# Patient Record
Sex: MALE | Race: BLACK OR AFRICAN AMERICAN | NOT HISPANIC OR LATINO | Employment: UNEMPLOYED | URBAN - NONMETROPOLITAN AREA
[De-identification: names, ages, dates, MRNs, and addresses within clinical notes are randomized per-mention and may not be internally consistent; named-entity substitution may affect disease eponyms.]

---

## 2017-02-15 ENCOUNTER — HOSPITAL ENCOUNTER (EMERGENCY)
Facility: HOSPITAL | Age: 52
Discharge: HOME | End: 2017-02-15
Admitting: FAMILY MEDICINE

## 2017-02-15 ENCOUNTER — APPOINTMENT (OUTPATIENT)
Dept: GENERAL RADIOLOGY | Facility: HOSPITAL | Age: 52
End: 2017-02-15

## 2017-02-15 VITALS
RESPIRATION RATE: 16 BRPM | WEIGHT: 197 LBS | SYSTOLIC BLOOD PRESSURE: 140 MMHG | HEIGHT: 72 IN | BODY MASS INDEX: 26.68 KG/M2 | HEART RATE: 24 BPM | DIASTOLIC BLOOD PRESSURE: 89 MMHG | OXYGEN SATURATION: 98 % | TEMPERATURE: 98 F

## 2017-02-15 DIAGNOSIS — M25.551 PAIN OF RIGHT HIP JOINT: ICD-10-CM

## 2017-02-15 DIAGNOSIS — M54.5 LOW BACK PAIN, UNSPECIFIED BACK PAIN LATERALITY, UNSPECIFIED CHRONICITY, WITH SCIATICA PRESENCE UNSPECIFIED: Primary | ICD-10-CM

## 2017-02-15 PROCEDURE — 99283 EMERGENCY DEPT VISIT LOW MDM: CPT

## 2017-02-15 PROCEDURE — 73502 X-RAY EXAM HIP UNI 2-3 VIEWS: CPT

## 2017-02-15 PROCEDURE — 72110 X-RAY EXAM L-2 SPINE 4/>VWS: CPT

## 2017-02-15 PROCEDURE — 25010000002 HYDROMORPHONE PER 4 MG: Performed by: NURSE PRACTITIONER

## 2017-02-15 PROCEDURE — 96372 THER/PROPH/DIAG INJ SC/IM: CPT

## 2017-02-15 RX ORDER — AMLODIPINE BESYLATE 5 MG/1
10 TABLET ORAL DAILY
COMMUNITY
End: 2018-07-12 | Stop reason: SDUPTHER

## 2017-02-15 RX ORDER — TIZANIDINE 4 MG/1
4 TABLET ORAL EVERY 12 HOURS PRN
COMMUNITY

## 2017-02-15 RX ORDER — METHYLPREDNISOLONE 4 MG/1
TABLET ORAL
Qty: 21 TABLET | Refills: 0 | Status: SHIPPED | OUTPATIENT
Start: 2017-02-15 | End: 2017-04-20

## 2017-02-15 RX ORDER — HYDROCODONE BITARTRATE AND ACETAMINOPHEN 7.5; 325 MG/1; MG/1
1 TABLET ORAL EVERY 6 HOURS PRN
COMMUNITY
End: 2017-04-05

## 2017-02-15 RX ORDER — HYDROCHLOROTHIAZIDE 25 MG/1
25 TABLET ORAL DAILY
COMMUNITY

## 2017-02-15 RX ORDER — ONDANSETRON 4 MG/1
4 TABLET, ORALLY DISINTEGRATING ORAL ONCE
Status: COMPLETED | OUTPATIENT
Start: 2017-02-15 | End: 2017-02-15

## 2017-02-15 RX ADMIN — ONDANSETRON 4 MG: 4 TABLET, ORALLY DISINTEGRATING ORAL at 18:42

## 2017-02-15 RX ADMIN — HYDROMORPHONE HYDROCHLORIDE 1 MG: 1 INJECTION, SOLUTION INTRAMUSCULAR; INTRAVENOUS; SUBCUTANEOUS at 18:42

## 2017-02-16 NOTE — ED PROVIDER NOTES
Subjective   Patient is a 51 y.o. male presenting with back pain.   History provided by:  Patient   used: No    Back Pain   Location:  Lumbar spine  Quality:  Aching and stiffness  Radiates to: right hip.  Pain severity:  Mild  Pain is:  Same all the time  Onset quality:  Sudden  Duration:  2 days  Timing:  Constant  Progression:  Worsening  Chronicity:  New  Context: not emotional stress, not falling, not jumping from heights, not lifting heavy objects, not MCA, not MVA, not pedestrian accident, not physical stress, not recent illness, not recent injury and not twisting    Relieved by:  Nothing  Worsened by:  Nothing  Ineffective treatments:  None tried  Associated symptoms: no abdominal pain, no abdominal swelling, no bladder incontinence, no bowel incontinence, no chest pain, no dysuria, no fever, no headaches, no leg pain, no numbness, no paresthesias, no pelvic pain, no perianal numbness, no tingling, no weakness and no weight loss    Risk factors: no hx of cancer, no hx of osteoporosis, no lack of exercise, no menopause, not obese, not pregnant, no recent surgery, no steroid use and no vascular disease        Review of Systems   Constitutional: Negative for fever and weight loss.   Cardiovascular: Negative for chest pain.   Gastrointestinal: Negative for abdominal pain and bowel incontinence.   Genitourinary: Negative for bladder incontinence, dysuria and pelvic pain.   Musculoskeletal: Positive for back pain.   Neurological: Negative for tingling, weakness, numbness, headaches and paresthesias.   All other systems reviewed and are negative.      Past Medical History   Diagnosis Date   • Degenerative disc disease, lumbar    • Hypertension        Allergies   Allergen Reactions   • Tramadol        Past Surgical History   Procedure Laterality Date   • Back surgery  07/2016     Dr Mota       History reviewed. No pertinent family history.    Social History     Social History   • Marital status:       Spouse name: N/A   • Number of children: N/A   • Years of education: N/A     Social History Main Topics   • Smoking status: Current Every Day Smoker     Packs/day: 0.50     Types: Cigarettes   • Smokeless tobacco: None   • Alcohol use 4.2 oz/week     7 Cans of beer per week   • Drug use: No   • Sexual activity: Defer     Other Topics Concern   • None     Social History Narrative   • None           Objective   Physical Exam   Constitutional: He is oriented to person, place, and time. He appears well-developed and well-nourished.   HENT:   Head: Normocephalic and atraumatic.   Eyes: Conjunctivae are normal. Pupils are equal, round, and reactive to light.   Neck: Normal range of motion. Neck supple.   Cardiovascular: Normal rate, regular rhythm and normal heart sounds.    Pulmonary/Chest: Effort normal and breath sounds normal.   Abdominal: Soft. Bowel sounds are normal.   Musculoskeletal: Normal range of motion.        Legs:  Neurological: He is alert and oriented to person, place, and time.   Skin: Skin is warm and dry.   Psychiatric: He has a normal mood and affect.   Nursing note and vitals reviewed.      Procedures         ED Course  ED Course   Comment By Time   Xray right hip: No fracture or acute abnormality. There is a linear bony  density in the soft tissues lateral to the right acetabulum which may be  related to old trauma. An Jefferson, APRN 02/15 1829   Xray lumbar: No acute lumbar spine abnormality. Advanced degenerative  disc disease at L3-4, L4-5 and L5-S1. An Jefferson, APRN 02/15 1829   Assessment at this time.  The lumbar x-ray showsno acute abnormality.  Patient has had degenerative changes noted.  On x-ray the right hip there is no fracture or acute abnormality noted.  There is a linear density soft tissue that was noted.  Discussed this with patient.  He denies any previous surgeries to the right hip.  He denies any previous trauma or injuries.  Otherwise this is likely the  cause of his right hip pain.Crutches to assist with ambulation.  Otherwise please follow-up to orthopedics will provide him the name of the orthopedic physician that is on call.  Advised follow-up tomorrow at the walk-in clinic.  Patient has pain medications at home that he is prescribed by pain management.Patient states this pain.  At this time patient will be discharged home in stable condition.  Rest and return to ER as needed.  I will give him prescription for a Medrol Dosepak to help his low back pain.  Again he is advised to return to the ER if any new or worsening symptoms. An Jefferson, APRN 02/15 1911                  MDM  Number of Diagnoses or Management Options  Low back pain, unspecified back pain laterality, unspecified chronicity, with sciatica presence unspecified: new and requires workup  Pain of right hip joint: new and requires workup     Amount and/or Complexity of Data Reviewed  Tests in the radiology section of CPT®: ordered and reviewed  Discuss the patient with other providers: yes    Patient Progress  Patient progress: stable      Final diagnoses:   Low back pain, unspecified back pain laterality, unspecified chronicity, with sciatica presence unspecified   Pain of right hip joint            An Jefferson, APRN  02/15/17 1912       An Jefferson, APRN  02/21/17 1903

## 2017-02-16 NOTE — DISCHARGE INSTRUCTIONS
Please follow-up with orthopedics.  Please call tomorrow for the walk-in clinic hours.  Use the pain medication that you have at home for pain control.  Use Medrol Dosepak to help with inflammation.  Return to the ER as needed sooner if any new or worsening symptoms.

## 2017-04-05 ENCOUNTER — HOSPITAL ENCOUNTER (EMERGENCY)
Facility: HOSPITAL | Age: 52
Discharge: HOME | End: 2017-04-05
Admitting: FAMILY MEDICINE

## 2017-04-05 ENCOUNTER — APPOINTMENT (OUTPATIENT)
Dept: GENERAL RADIOLOGY | Facility: HOSPITAL | Age: 52
End: 2017-04-05

## 2017-04-05 ENCOUNTER — APPOINTMENT (OUTPATIENT)
Dept: CT IMAGING | Facility: HOSPITAL | Age: 52
End: 2017-04-05

## 2017-04-05 VITALS
BODY MASS INDEX: 27.93 KG/M2 | HEIGHT: 72 IN | TEMPERATURE: 98 F | RESPIRATION RATE: 20 BRPM | WEIGHT: 206.2 LBS | DIASTOLIC BLOOD PRESSURE: 95 MMHG | OXYGEN SATURATION: 98 % | HEART RATE: 81 BPM | SYSTOLIC BLOOD PRESSURE: 155 MMHG
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
## 2017-04-05 DIAGNOSIS — S20.212A CONTUSION, CHEST WALL, LEFT, INITIAL ENCOUNTER: ICD-10-CM

## 2017-04-05 DIAGNOSIS — S39.012A BACK STRAIN, INITIAL ENCOUNTER: ICD-10-CM

## 2017-04-05 PROCEDURE — 96372 THER/PROPH/DIAG INJ SC/IM: CPT

## 2017-04-05 PROCEDURE — 99283 EMERGENCY DEPT VISIT LOW MDM: CPT

## 2017-04-05 PROCEDURE — 72110 X-RAY EXAM L-2 SPINE 4/>VWS: CPT

## 2017-04-05 PROCEDURE — 25010000002 DIAZEPAM PER 5 MG: Performed by: FAMILY MEDICINE

## 2017-04-05 PROCEDURE — 71020 HC CHEST PA AND LATERAL: CPT

## 2017-04-05 PROCEDURE — 25010000002 MORPHINE PER 10 MG: Performed by: FAMILY MEDICINE

## 2017-04-05 PROCEDURE — 72125 CT NECK SPINE W/O DYE: CPT

## 2017-04-05 PROCEDURE — 72072 X-RAY EXAM THORAC SPINE 3VWS: CPT

## 2017-04-05 RX ORDER — HYDROCODONE BITARTRATE AND ACETAMINOPHEN 7.5; 325 MG/1; MG/1
1 TABLET ORAL EVERY 6 HOURS PRN
Qty: 12 TABLET | Refills: 0 | Status: SHIPPED | OUTPATIENT
Start: 2017-04-05 | End: 2018-07-12

## 2017-04-05 RX ORDER — ONDANSETRON 4 MG/1
4 TABLET, ORALLY DISINTEGRATING ORAL ONCE
Status: COMPLETED | OUTPATIENT
Start: 2017-04-05 | End: 2017-04-05

## 2017-04-05 RX ORDER — ASPIRIN 81 MG/1
81 TABLET ORAL DAILY
COMMUNITY

## 2017-04-05 RX ORDER — DIAZEPAM 5 MG/ML
5 INJECTION, SOLUTION INTRAMUSCULAR; INTRAVENOUS ONCE
Status: COMPLETED | OUTPATIENT
Start: 2017-04-05 | End: 2017-04-05

## 2017-04-05 RX ORDER — MORPHINE SULFATE 4 MG/ML
4 INJECTION, SOLUTION INTRAMUSCULAR; INTRAVENOUS ONCE
Status: COMPLETED | OUTPATIENT
Start: 2017-04-05 | End: 2017-04-05

## 2017-04-05 RX ADMIN — ONDANSETRON 4 MG: 4 TABLET, ORALLY DISINTEGRATING ORAL at 13:51

## 2017-04-05 RX ADMIN — MORPHINE SULFATE 4 MG: 4 INJECTION, SOLUTION INTRAMUSCULAR; INTRAVENOUS at 13:50

## 2017-04-05 RX ADMIN — DIAZEPAM 5 MG: 5 INJECTION, SOLUTION INTRAMUSCULAR; INTRAVENOUS at 13:51
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
## 2017-04-20 ENCOUNTER — OFFICE VISIT (OUTPATIENT)
Dept: CARDIOLOGY | Facility: CLINIC | Age: 52
End: 2017-04-20

## 2017-04-20 VITALS
WEIGHT: 201.6 LBS | HEIGHT: 72 IN | SYSTOLIC BLOOD PRESSURE: 116 MMHG | HEART RATE: 63 BPM | DIASTOLIC BLOOD PRESSURE: 86 MMHG | BODY MASS INDEX: 27.3 KG/M2

## 2017-04-20 DIAGNOSIS — R00.2 PALPITATIONS: ICD-10-CM

## 2017-04-20 DIAGNOSIS — I48.92 ATRIAL FLUTTER, UNSPECIFIED TYPE (HCC): ICD-10-CM

## 2017-04-20 DIAGNOSIS — I10 ESSENTIAL HYPERTENSION: Primary | ICD-10-CM

## 2017-04-20 PROCEDURE — 93000 ELECTROCARDIOGRAM COMPLETE: CPT | Performed by: INTERNAL MEDICINE

## 2017-04-20 PROCEDURE — 99204 OFFICE O/P NEW MOD 45 MIN: CPT | Performed by: INTERNAL MEDICINE

## 2017-04-20 NOTE — ASSESSMENT & PLAN NOTE
Hypertension is improving with treatment.  Continue current treatment regimen.  Blood pressure will be reassessed at the next regular appointment.

## 2017-04-20 NOTE — ASSESSMENT & PLAN NOTE
Unclear whether he actually had this or not - ECG in office today with high frequency artifact that could be misinterpreted as flutter.  Will need to obtain ECG performed in Dr. Sharma's clinic before confirming diagnosis.

## 2017-04-20 NOTE — PROGRESS NOTES
"    [facility] - CARDIOLOGY  New Patient Initial Outpatient Evaulation    Primary Care Physician: Pete Sharma Jr., MD    Subjective     Chief Complaint: palpitations    51 year old referred from Dr. Sharma, whom he saw for a check up on 3/24/17.  Per records and my conversation with Dr. Sharma that day, the patient was incidentally noted on his exam to have an irregular heart beat.  Records indicate an ECG was done that showed atrial flutter with variable AV block (that ECG is not available for my review today).  Patient was started on aspirin for stroke prevention, and comes today for first cardiology evaluation.  He does report palpitations as described below, though he has noticed these for years and is not worried by them.  In fact, he reports an overnight admission \"years ago\" at [facility] for this, and recalls wearing a 24-hour Holter.  He denies recollection of what was found out during those investigations.    Palpitations    This is a chronic problem. The current episode started more than 1 year ago. The problem occurs intermittently. The problem has been unchanged. On average, each episode lasts 2 minutes. Associated symptoms include chest fullness, dizziness, numbness and shortness of breath. Pertinent negatives include no chest pain, coughing, irregular heartbeat, malaise/fatigue, nausea, near-syncope, syncope, vomiting or weakness. He has tried nothing for the symptoms.   Shortness of Breath   This is a new problem. The current episode started today (Reports getting very out of breath walking in from garage today, which he says has never happened before.). The problem has been unchanged. Associated symptoms include neck pain. Pertinent negatives include no abdominal pain, chest pain, claudication, leg swelling, orthopnea, PND, syncope, vomiting or wheezing.         Review of Systems   Constitution: Negative. Negative for weakness, malaise/fatigue, weight gain and weight loss.   HENT: Negative.  Negative " for nosebleeds.    Eyes: Positive for blurred vision.   Cardiovascular: Positive for dyspnea on exertion. Negative for chest pain, claudication, irregular heartbeat, leg swelling, near-syncope, orthopnea, palpitations, paroxysmal nocturnal dyspnea and syncope.   Respiratory: Positive for shortness of breath and snoring. Negative for cough and wheezing.    Endocrine: Negative.    Hematologic/Lymphatic: Negative.  Negative for bleeding problem. Does not bruise/bleed easily.   Skin: Negative.    Musculoskeletal: Positive for arthritis, back pain, joint pain, joint swelling, muscle cramps, muscle weakness, neck pain and stiffness.   Gastrointestinal: Negative.  Negative for bloating, abdominal pain, dysphagia, heartburn, hematemesis, hematochezia, melena, nausea and vomiting.   Genitourinary: Negative.  Negative for hematuria and non-menstrual bleeding.   Neurological: Positive for dizziness and numbness. Negative for light-headedness and loss of balance.        Right foot numb from surgery    Psychiatric/Behavioral: Negative for memory loss.   Allergic/Immunologic: Negative.         Otherwise complete ROS reviewed and negative except as mentioned in the HPI.      Past Medical History:   Past Medical History:   Diagnosis Date   • Atrial flutter    • Degenerative disc disease, lumbar    • Hypertension        Past Surgical History:  Past Surgical History:   Procedure Laterality Date   • BACK SURGERY  07/2016    Dr Mota       Family History: family history includes No Known Problems in his father and mother.    Social History:  reports that he has been smoking Cigarettes.  He has been smoking about 0.10 packs per day. He does not have any smokeless tobacco history on file. He reports that he drinks about 4.2 oz of alcohol per week  He reports that he does not use illicit drugs.    Medications:  Prior to Admission medications    Medication Sig Start Date End Date Taking? Authorizing Provider   amLODIPine (NORVASC) 5 MG  "tablet Take 10 mg by mouth Daily.   Yes Historical Provider, MD   aspirin 81 MG EC tablet Take 81 mg by mouth Daily.   Yes Historical Provider, MD   GABAPENTIN, ONCE-DAILY, PO Take 300 mg by mouth 3 (Three) Times a Day.   Yes Historical Provider, MD   hydrochlorothiazide (HYDRODIURIL) 25 MG tablet Take 25 mg by mouth Daily.   Yes Historical Provider, MD   HYDROcodone-acetaminophen (NORCO) 7.5-325 MG per tablet Take 1 tablet by mouth Every 6 (Six) Hours As Needed for Moderate Pain (4-6). 4/5/17  Yes Josias Miller MD   tiZANidine (ZANAFLEX) 4 MG tablet Take 4 mg by mouth Every 12 (Twelve) Hours As Needed for muscle spasms.   Yes Historical Provider, MD   oxyCODONE-acetaminophen (PERCOCET) 5-325 MG per tablet Take 1 tablet by mouth every 12 (twelve) hours as needed for severe pain (7-10). 9/8/16 4/20/17 Yes Frederick Mota MD   MethylPREDNISolone (MEDROL, AYDIN,) 4 MG tablet Take as directed on package instructions. 2/15/17 4/20/17  [facility] Jesus     Allergies:  Allergies   Allergen Reactions   • Tramadol Other (See Comments)     Bradycardia       Objective     Vital Signs: /86 (BP Location: Left arm, Patient Position: Sitting)  Pulse 63  Ht 72\" (182.9 cm)  Wt 201 lb 9.6 oz (91.4 kg)  BMI 27.34 kg/m2    Physical Exam   Constitutional: No distress.   HENT:   Mouth/Throat: Oropharynx is clear. Pharynx is normal.   Neck: Normal range of motion and thyroid normal. Neck supple. No JVD present. No thyromegaly present.   Cardiovascular: Normal rate, regular rhythm, S1 normal, S2 normal, normal heart sounds, intact distal pulses and normal pulses.   No extrasystoles are present. PMI is not displaced.    Pulmonary/Chest: Effort normal and breath sounds normal.   Abdominal: Soft. Bowel sounds are normal. He exhibits no distension. There is no splenomegaly or hepatomegaly. There is no tenderness.   Musculoskeletal: He exhibits no edema or tenderness.   Neurological: He is alert and oriented to person, " place, and time.   Skin: Skin is warm and dry.       Results Reviewed:      ECG 12 Lead  Date/Time: 4/20/2017 9:41 AM  Performed by: SARAH BOSS  Authorized by: SARAH BOSS   Rhythm: sinus rhythm  T depression: II, III, aVF, V5, V6 and V4  Clinical impression: abnormal ECG            Reviewed Dr. Sharma's clinic visit note from 3/24/17; no ECG available  No results found for: CHOL, TRIG, HDL, LDLCALC, VLDL, LDLHDL  No results found for: HGBA1C    Assessment / Plan      Problem List Items Addressed This Visit        Cardiovascular and Mediastinum    Hypertension    Current Assessment & Plan     Hypertension is improving with treatment.  Continue current treatment regimen.  Blood pressure will be reassessed at the next regular appointment.         Palpitations - check echocardiogram.    Atrial flutter    Overview     Reportedly seen on ECG by Dr. Sharma 3/24/17. CHADS2-VASc=1 (HTN); on aspirin.         Current Assessment & Plan     Unclear whether he actually had this or not - ECG in office today with high frequency artifact that could be misinterpreted as flutter.  Will need to obtain ECG performed in Dr. Sharma's clinic before confirming diagnosis.             Sarah Boss MD   04/20/17   9:58 AM     Addendum (5/26/17 at 1423)    We received 12-lead electrocardiogram from Dr. Sharma's office performed on 3/23/17 at 1645: Does show atrial flutter with variable AV conduction.  Patient is on appropriate therapy with anticoagulation in the form of aspirin given his low CHADS score.  We will schedule him for annual follow-up.    Sarah Boss MD  2:24 PM  05/26/17

## 2017-04-26 ENCOUNTER — HOSPITAL ENCOUNTER (OUTPATIENT)
Dept: CARDIOLOGY | Facility: HOSPITAL | Age: 52
Discharge: HOME | End: 2017-04-26
Attending: INTERNAL MEDICINE | Admitting: INTERNAL MEDICINE

## 2017-04-26 VITALS
DIASTOLIC BLOOD PRESSURE: 60 MMHG | BODY MASS INDEX: 27.22 KG/M2 | WEIGHT: 201 LBS | SYSTOLIC BLOOD PRESSURE: 130 MMHG | HEIGHT: 72 IN

## 2017-04-26 DIAGNOSIS — R00.2 PALPITATIONS: ICD-10-CM

## 2017-04-26 PROCEDURE — 93306 TTE W/DOPPLER COMPLETE: CPT

## 2017-04-26 PROCEDURE — 93306 TTE W/DOPPLER COMPLETE: CPT | Performed by: INTERNAL MEDICINE

## 2017-04-28 LAB
BH CV ECHO MEAS - AO MAX PG (FULL): 4.7 MMHG
BH CV ECHO MEAS - AO MAX PG: 10.8 MMHG
BH CV ECHO MEAS - AO MEAN PG (FULL): 3 MMHG
BH CV ECHO MEAS - AO MEAN PG: 6 MMHG
BH CV ECHO MEAS - AO ROOT AREA: 9.1 CM^2
BH CV ECHO MEAS - AO ROOT DIAM: 3.4 CM
BH CV ECHO MEAS - AO V2 MAX: 164 CM/SEC
BH CV ECHO MEAS - AO V2 MEAN: 109 CM/SEC
BH CV ECHO MEAS - AO V2 VTI: 32.1 CM
BH CV ECHO MEAS - AVA(I,A): 2.3 CM^2
BH CV ECHO MEAS - AVA(I,D): 2.3 CM^2
BH CV ECHO MEAS - AVA(V,A): 2.4 CM^2
BH CV ECHO MEAS - AVA(V,D): 2.4 CM^2
BH CV ECHO MEAS - CONTRAST EF 4CH: 64.1 ML/M^2
BH CV ECHO MEAS - EDV(CUBED): 109.2 ML
BH CV ECHO MEAS - EDV(MOD-SP4): 108 ML
BH CV ECHO MEAS - EDV(TEICH): 106.5 ML
BH CV ECHO MEAS - EF(CUBED): 71.7 %
BH CV ECHO MEAS - EF(MOD-SP4): 64.1 %
BH CV ECHO MEAS - EF(TEICH): 63.3 %
BH CV ECHO MEAS - ESV(CUBED): 31 ML
BH CV ECHO MEAS - ESV(MOD-SP4): 38.8 ML
BH CV ECHO MEAS - ESV(TEICH): 39.1 ML
BH CV ECHO MEAS - FS: 34.3 %
BH CV ECHO MEAS - IVS/LVPW: 1.1
BH CV ECHO MEAS - IVSD: 1.3 CM
BH CV ECHO MEAS - LA DIMENSION: 3.9 CM
BH CV ECHO MEAS - LA/AO: 1.1
BH CV ECHO MEAS - LAT PEAK E' VEL: 9.7 CM/SEC
BH CV ECHO MEAS - LV MASS(C)D: 170 GRAMS
BH CV ECHO MEAS - LV MAX PG: 6.1 MMHG
BH CV ECHO MEAS - LV MEAN PG: 3 MMHG
BH CV ECHO MEAS - LV V1 MAX: 123 CM/SEC
BH CV ECHO MEAS - LV V1 MEAN: 80.4 CM/SEC
BH CV ECHO MEAS - LV V1 VTI: 23.9 CM
BH CV ECHO MEAS - LVIDD: 4.8 CM
BH CV ECHO MEAS - LVIDS: 3.1 CM
BH CV ECHO MEAS - LVLD AP4: 8.8 CM
BH CV ECHO MEAS - LVLS AP4: 7.9 CM
BH CV ECHO MEAS - LVOT AREA (M): 3.1 CM^2
BH CV ECHO MEAS - LVOT AREA: 3.1 CM^2
BH CV ECHO MEAS - LVOT DIAM: 2 CM
BH CV ECHO MEAS - LVPWD: 1.3 CM
BH CV ECHO MEAS - MV A MAX VEL: 56.8 CM/SEC
BH CV ECHO MEAS - MV DEC TIME: 0.2 SEC
BH CV ECHO MEAS - MV E MAX VEL: 65 CM/SEC
BH CV ECHO MEAS - MV E/A: 1.1
BH CV ECHO MEAS - PI END-D VEL: 139 CM/SEC
BH CV ECHO MEAS - RAP SYSTOLE: 5 MMHG
BH CV ECHO MEAS - RVSP: 35.9 MMHG
BH CV ECHO MEAS - SV(AO): 291.4 ML
BH CV ECHO MEAS - SV(CUBED): 78.3 ML
BH CV ECHO MEAS - SV(LVOT): 75.1 ML
BH CV ECHO MEAS - SV(MOD-SP4): 69.2 ML
BH CV ECHO MEAS - SV(TEICH): 67.4 ML
BH CV ECHO MEAS - TR MAX VEL: 278 CM/SEC
E/E' RATIO: 11.5

## 2017-05-26 ENCOUNTER — TELEPHONE (OUTPATIENT)
Dept: CARDIOLOGY | Facility: CLINIC | Age: 52
End: 2017-05-26

## 2017-07-10 ENCOUNTER — OFFICE VISIT (OUTPATIENT)
Dept: NEUROSURGERY | Facility: CLINIC | Age: 52
End: 2017-07-10

## 2017-07-10 VITALS
DIASTOLIC BLOOD PRESSURE: 80 MMHG | HEIGHT: 72 IN | SYSTOLIC BLOOD PRESSURE: 120 MMHG | WEIGHT: 188 LBS | BODY MASS INDEX: 25.47 KG/M2

## 2017-07-10 DIAGNOSIS — IMO0001 NORMAL BODY MASS INDEX (BMI): ICD-10-CM

## 2017-07-10 DIAGNOSIS — F17.210 CIGARETTE SMOKER: ICD-10-CM

## 2017-07-10 DIAGNOSIS — M54.16 LUMBAR RADICULOPATHY: Primary | ICD-10-CM

## 2017-07-10 PROCEDURE — 99213 OFFICE O/P EST LOW 20 MIN: CPT | Performed by: NEUROLOGICAL SURGERY

## 2017-07-10 RX ORDER — METHYLPREDNISOLONE 4 MG/1
TABLET ORAL
Qty: 21 TABLET | Refills: 0 | Status: SHIPPED | OUTPATIENT
Start: 2017-07-10 | End: 2018-07-12

## 2017-07-10 NOTE — PROGRESS NOTES
"    Chief complaint   Chief Complaint   Patient presents with   • Back Pain        Subjective     HPI:     This patient is a 51-year-old male with chronic low back pain who is in pain management.  He also had physical therapy for his chronic low back pain.  I operated on this patient 1 year ago for extreme right leg radiculopathy and his leg pain resolved after surgery.  His preoperative right leg numbness did not significantly improve after surgery but his quality of life improved as his leg pain resolved.  The patient presents to clinic again because he states that he has had spontaneous onset of right leg pain that 1 week ago.  He still is in pain management.  He states his right leg pain is intermittent but severe when it occurs.  There are no modifying factors.  He has not had any recent imaging completed.  She also states that he is now working towards disability and request for me to help him with his disability paperwork.    Review of Systems     A 12 point review of systems is obtained and is negative except for as described in HPI    Past Medical History:   Diagnosis Date   • Atrial flutter    • Degenerative disc disease, lumbar    • Hypertension      Past Surgical History:   Procedure Laterality Date   • BACK SURGERY  07/2016    Dr Mota     Family History   Problem Relation Age of Onset   • No Known Problems Mother    • No Known Problems Father      Social History   Substance Use Topics   • Smoking status: Current Every Day Smoker     Packs/day: 0.10     Types: Cigarettes   • Smokeless tobacco: None      Comment: about 5 a day    • Alcohol use 4.2 oz/week     7 Cans of beer per week      Comment: every day        (Not in a hospital admission)  Allergies:  Tramadol    Objective      Vital Signs  /80  Ht 72\" (182.9 cm)  Wt 188 lb (85.3 kg)  BMI 25.5 kg/m2    Physical Exam    No acute distress  Awake alert oriented ×3  HEENT atraumatic normocephalic  Neck supple  Abdomen soft nontender  Extremities " no clubbing, edema, cyanosis  Neurologic exam  Cranial nerves II through XII intact  Patient moves all extremities 5 out 5 strength  Sensation is diminished to light touch along the right L5, S1 distribution  Gait is normal  No long tract signs  No cerebellar findings    Results Review: No recent imaging completed          Assessment/Plan:     51-year-old male with chronic low back pain and multilevel lumbar spondylosis who is status post right L4, 5 microlumbar discectomy with resolution of his leg pain 1 year ago.  He states his right leg pain has recurred 1 week ago.  I will make a referral to physical therapy and prescribe steroids.  He is currently in pain management.  I will order an MRI of his lumbar spine and follow-up with him after completion of study.    I discussed the patients findings and my recommendations with patient    Frederick Mota MD  07/10/17  1:13 PM

## 2017-07-26 ENCOUNTER — OFFICE VISIT (OUTPATIENT)
Dept: NEUROSURGERY | Facility: CLINIC | Age: 52
End: 2017-07-26

## 2017-07-26 ENCOUNTER — HOSPITAL ENCOUNTER (OUTPATIENT)
Dept: MRI IMAGING | Facility: HOSPITAL | Age: 52
Discharge: HOME | End: 2017-07-26
Attending: NEUROLOGICAL SURGERY | Admitting: NEUROLOGICAL SURGERY

## 2017-07-26 VITALS
HEIGHT: 72 IN | WEIGHT: 188 LBS | DIASTOLIC BLOOD PRESSURE: 80 MMHG | SYSTOLIC BLOOD PRESSURE: 140 MMHG | BODY MASS INDEX: 25.47 KG/M2

## 2017-07-26 DIAGNOSIS — M54.16 LUMBAR RADICULOPATHY: ICD-10-CM

## 2017-07-26 DIAGNOSIS — IMO0001 NORMAL BODY MASS INDEX (BMI): ICD-10-CM

## 2017-07-26 DIAGNOSIS — F17.210 CIGARETTE SMOKER: ICD-10-CM

## 2017-07-26 DIAGNOSIS — M54.16 LUMBAR RADICULOPATHY: Primary | ICD-10-CM

## 2017-07-26 PROCEDURE — 99213 OFFICE O/P EST LOW 20 MIN: CPT | Performed by: NEUROLOGICAL SURGERY

## 2017-07-26 PROCEDURE — 72148 MRI LUMBAR SPINE W/O DYE: CPT

## 2017-07-26 NOTE — PATIENT INSTRUCTIONS
Smoking Hazards  Smoking cigarettes is extremely bad for your health. Tobacco smoke has over 200 known poisons in it. It contains the poisonous gases nitrogen oxide and carbon monoxide. There are over 60 chemicals in tobacco smoke that cause cancer. Some of the chemicals found in cigarette smoke include:   · Cyanide.    · Benzene.    · Formaldehyde.    · Methanol (wood alcohol).    · Acetylene (fuel used in welding torches).    · Ammonia.    Even smoking lightly shortens your life expectancy by several years. You can greatly reduce the risk of medical problems for you and your family by stopping now. Smoking is the most preventable cause of death and disease in our society. Within days of quitting smoking, your circulation improves, you decrease the risk of having a heart attack, and your lung capacity improves. There may be some increased phlegm in the first few days after quitting, and it may take months for your lungs to clear up completely. Quitting for 10 years reduces your risk of developing lung cancer to almost that of a nonsmoker.   WHAT ARE THE RISKS OF SMOKING?  Cigarette smokers have an increased risk of many serious medical problems, including:  · Lung cancer.    · Lung disease (such as pneumonia, bronchitis, and emphysema).    · Heart attack and chest pain due to the heart not getting enough oxygen (angina).    · Heart disease and peripheral blood vessel disease.    · Hypertension.    · Stroke.    · Oral cancer (cancer of the lip, mouth, or voice box).    · Bladder cancer.    · Pancreatic cancer.    · Cervical cancer.    · Pregnancy complications, including premature birth.    · Stillbirths and smaller  babies, birth defects, and genetic damage to sperm.    · Early menopause.    · Lower estrogen level for women.    · Infertility.    · Facial wrinkles.    · Blindness.    · Increased risk of broken bones (fractures).    · Senile dementia.    · Stomach ulcers and internal bleeding.    · Delayed  wound healing and increased risk of complications during surgery.  Because of secondhand smoke exposure, children of smokers have an increased risk of the following:   · Sudden infant death syndrome (SIDS).    · Respiratory infections.    · Lung cancer.    · Heart disease.    · Ear infections.    WHY IS SMOKING ADDICTIVE?  Nicotine is the chemical agent in tobacco that is capable of causing addiction or dependence. When you smoke and inhale, nicotine is absorbed rapidly into the bloodstream through your lungs. Both inhaled and noninhaled nicotine may be addictive.   WHAT ARE THE BENEFITS OF QUITTING?   There are many health benefits to quitting smoking. Some are:   · The likelihood of developing cancer and heart disease decreases. Health improvements are seen almost immediately.    · Blood pressure, pulse rate, and breathing patterns start returning to normal soon after quitting.    · People who quit may see an improvement in their overall quality of life.    HOW DO YOU QUIT SMOKING?  Smoking is an addiction with both physical and psychological effects, and longtime habits can be hard to change. Your health care provider can recommend:  · Programs and community resources, which may include group support, education, or therapy.  · Replacement products, such as patches, gum, and nasal sprays. Use these products only as directed. Do not replace cigarette smoking with electronic cigarettes (commonly called e-cigarettes). The safety of e-cigarettes is unknown, and some may contain harmful chemicals.  FOR MORE INFORMATION  · American Lung Association: www.lung.org  · American Cancer Society: www.cancer.org     This information is not intended to replace advice given to you by your health care provider. Make sure you discuss any questions you have with your health care provider.     Document Released: 01/25/2006 Document Revised: 04/10/2017 Document Reviewed: 06/09/2014  Elsevier Interactive Patient Education ©2017  [employer]

## 2017-08-21 NOTE — PROGRESS NOTES
"    Chief complaint   Chief Complaint   Patient presents with   • Back Pain        Subjective     HPI:        This patient is a 51-year-old male with chronic low back pain who is in pain management.  He also had physical therapy for his chronic low back pain.  I operated on this patient 1 year ago for extreme right leg radiculopathy and his leg pain resolved after surgery.  His preoperative right leg numbness did not significantly improve after surgery but his quality of life improved as his leg pain resolved.  The patient presents to clinic again because he states that he has had spontaneous onset of right leg pain that 1 week ago.  He still is in pain management.  He states his right leg pain is intermittent but severe when it occurs.  There are no modifying factors.  He has not had any recent imaging completed.  She also states that he is now working towards disability and request for me to help him with his disability paperwork.    His symptoms have not improved, he presents after obtaining an MRI.    Review of Systems     A 12 point review systems is obtained and is negative except for as described in HPI    Past Medical History:   Diagnosis Date   • Atrial flutter    • Degenerative disc disease, lumbar    • Hypertension      Past Surgical History:   Procedure Laterality Date   • BACK SURGERY  07/2016    Dr Mota     Family History   Problem Relation Age of Onset   • No Known Problems Mother    • No Known Problems Father      Social History   Substance Use Topics   • Smoking status: Current Every Day Smoker     Packs/day: 0.10     Types: Cigarettes   • Smokeless tobacco: None      Comment: about 5 a day    • Alcohol use 4.2 oz/week     7 Cans of beer per week      Comment: every day        (Not in a hospital admission)  Allergies:  Tramadol    Objective      Vital Signs  /80  Ht 72\" (182.9 cm)  Wt 188 lb (85.3 kg)  BMI 25.5 kg/m2    Physical Exam    No acute distress  Awake alert oriented ×3  HEENT " No f/u   atraumatic normocephalic  Neck supple  Abdomen soft nontender  Extremities no clubbing, edema, cyanosis  Neurologic exam  Cranial nerves II through XII intact  Patient moves all extremities 5 out 5 strength  Sensation is diminished to light touch along the right L5, S1 distribution  Gait is normal  No long tract signs  No cerebellar findings    Results Review:     MRI July 2017 shows a right L3, 4 disc herniation          Assessment/Plan:     51-year-old male with two-week history of right leg radiculopathy and a right L3, 4 disc herniation.  I reviewed imaging findings with the patient.  I recommend continuing conservative management including physical therapy and pain management.  If his symptoms persist after a trial conservative management over 3 months, he may be candidate for a right L 3,4 microlumbar discectomy.  All imaging reviewed, all questions answered.  Thank you for this consultation.    I discussed the patients findings and my recommendations with patient    Frederick Mota MD  07/26/17  3:52 PM

## 2017-09-20 ENCOUNTER — TRANSCRIBE ORDERS (OUTPATIENT)
Dept: ADMINISTRATIVE | Facility: HOSPITAL | Age: 52
End: 2017-09-20

## 2017-09-20 ENCOUNTER — HOSPITAL ENCOUNTER (OUTPATIENT)
Dept: GENERAL RADIOLOGY | Facility: HOSPITAL | Age: 52
Discharge: HOME | End: 2017-09-20
Attending: PAIN MEDICINE

## 2017-09-20 ENCOUNTER — HOSPITAL ENCOUNTER (OUTPATIENT)
Dept: GENERAL RADIOLOGY | Facility: HOSPITAL | Age: 52
Discharge: HOME | End: 2017-09-20
Attending: PAIN MEDICINE | Admitting: PAIN MEDICINE

## 2017-09-20 DIAGNOSIS — M48.02 CERVICAL SPINAL STENOSIS: ICD-10-CM

## 2017-09-20 DIAGNOSIS — M50.30 DEGENERATION OF CERVICAL INTERVERTEBRAL DISC: ICD-10-CM

## 2017-09-20 DIAGNOSIS — M25.512 LEFT SHOULDER PAIN, UNSPECIFIED CHRONICITY: Primary | ICD-10-CM

## 2017-09-20 PROCEDURE — 73030 X-RAY EXAM OF SHOULDER: CPT

## 2017-09-20 PROCEDURE — 72050 X-RAY EXAM NECK SPINE 4/5VWS: CPT

## 2017-10-25 ENCOUNTER — OFFICE VISIT (OUTPATIENT)
Dept: NEUROSURGERY | Facility: CLINIC | Age: 52
End: 2017-10-25

## 2017-10-25 VITALS
DIASTOLIC BLOOD PRESSURE: 90 MMHG | WEIGHT: 188 LBS | HEIGHT: 72 IN | SYSTOLIC BLOOD PRESSURE: 120 MMHG | BODY MASS INDEX: 25.47 KG/M2

## 2017-10-25 DIAGNOSIS — IMO0001 NORMAL BODY MASS INDEX (BMI): ICD-10-CM

## 2017-10-25 DIAGNOSIS — R20.0 NUMBNESS AND TINGLING OF RIGHT LEG: ICD-10-CM

## 2017-10-25 DIAGNOSIS — F17.210 CIGARETTE SMOKER: ICD-10-CM

## 2017-10-25 DIAGNOSIS — M54.16 LUMBAR RADICULOPATHY: Primary | ICD-10-CM

## 2017-10-25 DIAGNOSIS — R20.2 NUMBNESS AND TINGLING OF RIGHT LEG: ICD-10-CM

## 2017-10-25 PROCEDURE — 99213 OFFICE O/P EST LOW 20 MIN: CPT | Performed by: NEUROLOGICAL SURGERY

## 2017-10-25 RX ORDER — TRAZODONE HYDROCHLORIDE 100 MG/1
TABLET ORAL
COMMUNITY
Start: 2017-10-07

## 2017-10-25 RX ORDER — AMLODIPINE BESYLATE 10 MG/1
TABLET ORAL
COMMUNITY
Start: 2017-10-07

## 2017-10-25 RX ORDER — GABAPENTIN 300 MG/1
CAPSULE ORAL
COMMUNITY
Start: 2017-10-19

## 2017-10-25 NOTE — PROGRESS NOTES
"    Chief complaint   Chief Complaint   Patient presents with   • Back Pain   • Leg Pain        Subjective     HPI:     On 7/26/17, this patient is a 51-year-old male with chronic low back pain who is in pain management.  He also had physical therapy for his chronic low back pain.  I operated on this patient 1 year ago for extreme right leg radiculopathy and his leg pain resolved after surgery.  His preoperative right leg numbness did not significantly improve after surgery but his quality of life improved as his leg pain resolved.  The patient presents to clinic again because he states that he has had spontaneous onset of right leg pain that 1 week ago.  He still is in pain management.  He states his right leg pain is intermittent but severe when it occurs.  There are no modifying factors.  Imaging showed a new right L3,4 disc herniation.    Patient presents for 2 month follow up visit.  He states his radiculopathy has resolved completely.  He does have numbness involving the dorsal aspect of his right foot, but he no longer has pain that radiates from his back to his right foot.    Review of Systems     12 point review of systems is obtained and is negative except for as described in HPI    Past Medical History:   Diagnosis Date   • Atrial flutter    • Degenerative disc disease, lumbar    • Hypertension      Past Surgical History:   Procedure Laterality Date   • BACK SURGERY  07/2016    Dr Mota     Family History   Problem Relation Age of Onset   • No Known Problems Mother    • No Known Problems Father      Social History   Substance Use Topics   • Smoking status: Current Every Day Smoker     Packs/day: 0.10     Types: Cigarettes   • Smokeless tobacco: None      Comment: about 5 a day    • Alcohol use 4.2 oz/week     7 Cans of beer per week      Comment: every day        (Not in a hospital admission)  Allergies:  Tramadol    Objective      Vital Signs  /90  Ht 72\" (182.9 cm)  Wt 188 lb (85.3 kg)  BMI 25.5 " kg/m2    Physical Exam    No acute distress  Awake alert oriented ×3  HEENT atraumatic normocephalic  Neck supple  Abdomen soft nontender  Extremities no clubbing, edema, cyanosis  Neurologic exam  Cranial nerves II through XII intact  Patient moves all extremities 5 out 5 strength  Sensation is diminished to light touch along the right L5, S1 distribution  Gait is normal  No long tract signs  No cerebellar findings       Results Review:     MRI shows right L3/4 disc hernation          Assessment/Plan:     52-year-old male with right L3 radiculopathy, resolved with conservative management.  Patient states he no longer has right leg pain and he is doing very well.  He does have numbness involving the dorsal side of his right foot.  We will further evaluate this with an EMG, nerve conduction sided test, however no neurosurgical intervention likely as his symptoms have significantly improved.    I discussed the patients findings and my recommendations with patient    Frederick Mota MD  10/25/17  3:28 PM

## 2017-11-17 ENCOUNTER — TELEPHONE (OUTPATIENT)
Dept: NEUROSURGERY | Facility: CLINIC | Age: 52
End: 2017-11-17

## 2017-11-17 NOTE — TELEPHONE ENCOUNTER
I tried to call the patient to resc his apt with Dr Mota he is scheduled for 11/20/17. His apt will be resc due to Dr Mota wanting to see patient after his emg is completed.

## 2017-11-20 ENCOUNTER — OFFICE VISIT (OUTPATIENT)
Dept: NEUROLOGY | Age: 52
End: 2017-11-20
Payer: MEDICAID

## 2017-11-20 VITALS
HEART RATE: 73 BPM | DIASTOLIC BLOOD PRESSURE: 92 MMHG | SYSTOLIC BLOOD PRESSURE: 133 MMHG | BODY MASS INDEX: 26.84 KG/M2 | HEIGHT: 72 IN | WEIGHT: 198.2 LBS

## 2017-11-20 DIAGNOSIS — G47.33 OBSTRUCTIVE SLEEP APNEA: Primary | ICD-10-CM

## 2017-11-20 DIAGNOSIS — R06.83 SNORING: ICD-10-CM

## 2017-11-20 DIAGNOSIS — G25.81 RESTLESS LEG SYNDROME: ICD-10-CM

## 2017-11-20 DIAGNOSIS — R06.81 WITNESSED APNEIC SPELLS: ICD-10-CM

## 2017-11-20 DIAGNOSIS — G47.00 INSOMNIA, UNSPECIFIED TYPE: ICD-10-CM

## 2017-11-20 PROCEDURE — 99203 OFFICE O/P NEW LOW 30 MIN: CPT | Performed by: PHYSICIAN ASSISTANT

## 2017-11-20 RX ORDER — ASPIRIN 81 MG/1
81 TABLET ORAL DAILY
COMMUNITY

## 2017-11-20 RX ORDER — TRAZODONE HYDROCHLORIDE 100 MG/1
0.5 TABLET ORAL NIGHTLY PRN
COMMUNITY
Start: 2017-10-07

## 2017-11-20 RX ORDER — TIZANIDINE 4 MG/1
4 TABLET ORAL 3 TIMES DAILY
COMMUNITY

## 2017-11-20 RX ORDER — HYDROCODONE BITARTRATE AND ACETAMINOPHEN 10; 325 MG/1; MG/1
1 TABLET ORAL 3 TIMES DAILY
COMMUNITY

## 2017-11-20 RX ORDER — HYDROCHLOROTHIAZIDE 25 MG/1
25 TABLET ORAL DAILY
COMMUNITY

## 2017-11-20 RX ORDER — AMLODIPINE BESYLATE 10 MG/1
1 TABLET ORAL DAILY
COMMUNITY
Start: 2017-10-07

## 2017-11-20 RX ORDER — GABAPENTIN 300 MG/1
1 CAPSULE ORAL 3 TIMES DAILY
COMMUNITY
Start: 2017-10-19

## 2017-11-20 NOTE — PROGRESS NOTES
[facility] SLEEP    Patient: Jett Burks  :  1965  Age:  50 y.o. MRN:  [id number]  Today:    17    Provider:  Shannon Rosenthal PA-C    Chief Complaint:  Chief Complaint   Patient presents with    New Patient     Referred by Dr. Sterling Maldonado for sleep evaluation    Other     Patient just does not sleep at night. Patient does not have sleep study set        History Source: History obtained from the patient. PCP: Elvia Veloz MD     Referring Provider: Martina Puentes MD  [facility],[address]  [place] [address]    HISTORY OF PRESENT ILLNESS:   Jett Burks is a 50y.o. year old male with a history of chronic neck pain and chronic low back pain who was referred for a sleep evaluation. He was referred for a sleep evaluation due to his history of snoring and witnessed apneas. He denies excessive daytime somnolence. Location or symptom:  Snoring  Onset:  Several years  Timing:  q hs  Severity:  Loud enough to be heard in another room  Associated: Witnessed apneas  Alleviated:  Nothing    Patient complains of snoring, denies excessive daytime somnolence, complains of restless legs, denies sleep disturbance, complains of witnessed apneas, denies gasping, complains of insomnia. He does not have difficulty initiating sleep. He does not have difficulty maintaining sleep. He sleeps 2 hours at a time and returns to sleep. The sleep is  restorative. He does use sleep aides. (Trazodone)  He does fall asleep when sedentary. He does awaken with a dry mouth. He does not awaken with a morning headache. He  does have nocturia.     PAST MEDICAL HITORY:    Medical History:  Past Medical History:   Diagnosis Date    Chronic low back pain     Chronic neck pain     Hypertension        Surgical History:  Past Surgical History:   Procedure Laterality Date    SPINE SURGERY  2016    LOWER BACK        Current Medications:  Current Outpatient Prescriptions   Medication Sig Dispense Refill    amLODIPine (NORVASC) 10 MG tablet Take 1 tablet by mouth daily      aspirin EC 81 MG EC tablet Take 81 mg by mouth daily      gabapentin (NEURONTIN) 300 MG capsule Take 1 capsule by mouth 3 times daily      hydrochlorothiazide (HYDRODIURIL) 25 MG tablet Take 25 mg by mouth daily      tiZANidine (ZANAFLEX) 4 MG tablet Take 4 mg by mouth 3 times daily      traZODone (DESYREL) 100 MG tablet Take 0.5 tablets by mouth nightly as needed      HYDROcodone-acetaminophen (NORCO)  MG per tablet Take 1 tablet by mouth 3 times daily . No current facility-administered medications for this visit. Allergies:  Tramadol    SOCIAL HISTORY:   History   Alcohol Use    1.2 oz/week    2 Cans of beer per week     Comment: 2- CANS DAILY     History   Drug Use No     History   Smoking Status    Current Every Day Smoker    Packs/day: 0.50   Smokeless Tobacco    Never Used       FAMILY HISTORY:   History reviewed. No pertinent family history.     REVIEW OF SYSTEMS     Constitutional: []Fever []Sweats []Chills [] Recent Injury   [x] Denies all unless marked  HENT:[]Headache  [] Head Injury  [] Sore Throat  [] Ear Pain  [] Dizziness [] Hearing Loss   [x] Denies all unless marked  Spine:  [x] Neck pain  [x] Back pain  [] Sciaticia  [] Denies all unless marked  Cardiovascular:[]Chest Pain []Palpitations [] Heart Disease  [x] Denies all unless marked  Pulmonary: [x]Shortness of Breath [x]Cough   [x] Denies all unless marked  Gastrointestinal:  []Abdominal Pain  []Blood in Stool  []Diarrhea []Constipation []Nausea  []Vomiting  [x] Denies all unless marked  Genitourinary:  [] Dysuria [] Frequency  [] Incontinence [] Urgency   [x] Denies all unless marked  Musculoskeletal: [x] Arthralgia  [x] Myalgias [x] Muscle cramps  [] Muscle twitches   [x] Denies all unless marked   Extremities:   [] Pain   [] Swelling   [x] Denies all unless marked  Skin:[] Rash  [] Color Change  [x] Denies all unless marked  Neurological:[] Visual Disturbance [] Double Vision Baseline Diagnostic Sleep Study      [facility]   4. Insomnia, unspecified type G47.00 780.52 Baseline Diagnostic Sleep Study      [facility]   5. Witnessed apneic spells R06.81 786.03 Baseline Diagnostic Sleep Study      [facility]        PLAN:  1. Orders Placed This Encounter   Procedures   [facility] [address]    Baseline Diagnostic Sleep Study     2. Patient advised of the etiology,  pathophysiology, diagnosis, treatment options, and risks of untreated SAM. Risks may include, but are not limited to  hypertension, coronary artery disease, diabetes, stroke, weight gain, impaired cognition, daytime somnolence,  and motor vehicle accidents. Advised to abstain from driving or operating heavy machinery when drowsy and the use of respiratory suppressants. 3.  We had a discussion about the clinical issues and went over the various important aspects to consider. Treatment plan discussed. All questions were answered. Pt voiced understanding and agrees with treatment plan. 4. The following educational material has been included in this visit after visit summary for your review:  SAM/PAP guidelines/sleep studies/CPAP-discussed with pt.  5.  Order-PSG  6. Monitor B/P and follow up with PMD if it persists to be elevated. 7.  Follow up per protocol       Note:  A total of >50% (>16 minutes) of 30 minutes was spent discussing the pathophysiology and treatment and/or coordination of care of the above diagnoses.

## 2017-11-20 NOTE — PATIENT INSTRUCTIONS
Patient Education      Patient Education         What is sleep apnea?  Sleep apnea is a condition that makes you stop breathing for short periods while you are asleep. There are 2 types of sleep apnea. One is called \"obstructive sleep apnea,\" and the other is called \"central sleep apnea. \"  In obstructive sleep apnea, you stop breathing because your throat narrows or closes. In central sleep apnea, you stop breathing because your brain does not send the right signals to your muscles to make you breathe. When people talk about sleep apnea, they are usually referring to obstructive sleep apnea, which is what this article is about. People with sleep apnea do not know that they stop breathing when they are asleep. But they do sometimes wake up startled or gasping for breath. They also often hear from loved ones that they snore. What are the symptoms of sleep apnea?  The main symptoms of sleep apnea are loud snoring, tiredness, and daytime sleepiness. Other symptoms can include:  ?Restless sleep  ? Waking up choking or gasping  ? Morning headaches, dry mouth, or sore throat  ? Waking up often to urinate  ? Waking up feeling unrested or groggy  ? Trouble thinking clearly or remembering things  Some people with sleep apnea don't have symptoms, or they don't know they have them. They might figure that it's normal to be tired or to snore a lot. Should I see a doctor or nurse?  Yes. If you think you might have sleep apnea, see your doctor. Is there a test for sleep apnea?  Yes. If your doctor or nurse suspects you have sleep apnea, he or she might send you for a \"sleep study. \" Sleep studies can sometimes be done at home, but they are usually done in a sleep lab. For the study, you spend the night in the lab, and you are hooked up to different machines that monitor your heart rate, breathing, and other body functions. The results of the test will tell your doctor or nurse if you have the disorder.     Is there anything I can do on my own to help my sleep apnea?  Yes. Here are some things that might help:  ? Stay off your back when sleeping. (This is not always practical, because people cannot control their position while asleep. Plus, it only helps some people.)  ? Lose weight, if you are overweight  ? Avoid alcohol, because it can make sleep apnea worse    How is sleep apnea treated?  As mentioned above, weight loss can help if you are overweight or obese. But losing weight can be challenging, and it takes time to lose enough weight to help with your sleep apnea. Most people need other treatment while they work on losing weight. The most effective treatment for sleep apnea is a device that keeps your airway open while you sleep. Treatment with this device is called \"continuous positive airway pressure,\" or CPAP. People getting CPAP wear a face mask at night that keeps them breathing. If your doctor or nurse recommends a CPAP machine, try to be patient about using it. The mask might seem uncomfortable to wear at first, and the machine might seem noisy, but using the machine can really pay off. People with sleep apnea who use a CPAP machine feel more rested and generally feel better. There is also another device that you wear in your mouth called an \"oral appliance\" or \"mandibular advancement device. \" It also helps keep your airway open while you sleep. This device is only recommended for mild cases of sleep apnea. It may not be covered by your insurance. In rare cases, when nothing else helps, doctors recommend surgery to keep the airway open. Surgery to do this is not always effective, and even when it is, the problem can come back. Is sleep apnea dangerous?  It can be. People with sleep apnea do not get good-quality sleep, so they are often tired and not alert. This puts them at risk for car accidents and other types of accidents.  Plus, studies show that people with sleep apnea are more likely than others to have call your doctor if you are having problems. It's also a good idea to keep a list of the medicines you take. Ask your doctor when you can expect to have your test results. Where can you learn more? Go to [email or web address].[email or web address]. org and sign in to your [employer] account. Enter U236 in the SDI box to learn more about \"Sleep Studies: About This Test.\"     If you do not have an account, please click on the \"Sign Up Now\" link. Current as of: March 25, 2017  Content Version: 11.3  © 9233-0546 [employer]. Care instructions adapted under license by [email or web address] [facility] ([facility]). If you have questions about a medical condition or this instruction, always ask your healthcare professional. [address] any warranty or liability for your use of this information. Learning About CPAP for Sleep Apnea  What is CPAP? CPAP is a small machine that you use at home every night while you sleep. It increases air pressure in your throat to keep your airway open. When you have sleep apnea, this can help you sleep better so you feel much better. CPAP stands for \"continuous positive airway pressure. \"  The CPAP machine will have one of the following:  · A mask that covers your nose and mouth  · Prongs that fit into your nose  · A mask that covers your nose only, the most common type. This type is called NCPAP. The N stands for \"nasal.\"  Why is it done? CPAP is usually the best treatment for obstructive sleep apnea. It is the first treatment choice and the most widely used. Your doctor may suggest CPAP if you have:  · Moderate to severe sleep apnea. · Sleep apnea and coronary artery disease (CAD) or heart failure. How does it help? · CPAP can help you have more normal sleep, so you feel less sleepy and more alert during the daytime. · CPAP may help keep heart failure or other heart problems from getting worse. · CPAP may help lower your blood pressure.   · If you use CPAP, your

## 2017-12-05 ENCOUNTER — HOSPITAL ENCOUNTER (OUTPATIENT)
Dept: NEUROLOGY | Facility: HOSPITAL | Age: 52
Discharge: HOME | End: 2017-12-05
Attending: NEUROLOGICAL SURGERY

## 2018-07-12 ENCOUNTER — OFFICE VISIT (OUTPATIENT)
Dept: NEUROSURGERY | Facility: CLINIC | Age: 53
End: 2018-07-12

## 2018-07-12 VITALS — HEIGHT: 72 IN | WEIGHT: 198 LBS | BODY MASS INDEX: 26.82 KG/M2

## 2018-07-12 DIAGNOSIS — M54.41 CHRONIC BILATERAL LOW BACK PAIN WITH RIGHT-SIDED SCIATICA: Primary | ICD-10-CM

## 2018-07-12 DIAGNOSIS — M25.551 RIGHT HIP PAIN: ICD-10-CM

## 2018-07-12 DIAGNOSIS — F17.210 CIGARETTE SMOKER: ICD-10-CM

## 2018-07-12 DIAGNOSIS — IMO0001 NORMAL BODY MASS INDEX (BMI): ICD-10-CM

## 2018-07-12 DIAGNOSIS — G89.29 CHRONIC BILATERAL LOW BACK PAIN WITH RIGHT-SIDED SCIATICA: Primary | ICD-10-CM

## 2018-07-12 PROCEDURE — 99213 OFFICE O/P EST LOW 20 MIN: CPT | Performed by: NURSE PRACTITIONER

## 2018-07-12 RX ORDER — HYDROCODONE BITARTRATE AND ACETAMINOPHEN 10; 325 MG/1; MG/1
1 TABLET ORAL EVERY 8 HOURS PRN
Qty: 90 TABLET | Refills: 0 | Status: SHIPPED | OUTPATIENT
Start: 2018-07-12

## 2018-07-12 NOTE — PROGRESS NOTES
"Neurosurgery Follow Up Office Visit      Patient Name:  Mehul Painter  Age:  52 y.o.  YOB: 1965  MR#:  [id number]    Ht 182.9 cm (72\")   Wt 89.8 kg (198 lb)   BMI 26.85 kg/m²     Social History   Substance Use Topics   • Smoking status: Current Every Day Smoker     Packs/day: 0.50     Types: Cigarettes   • Smokeless tobacco: Never Used   • Alcohol use 4.2 oz/week     7 Cans of beer per week      Comment: every day        Chief Complaint   Patient presents with   • Follow-up     LBP radiating into the R hip.  He also has numbness in the R foot         History  Chief Complaint:  He returns to the office for follow-up of low back pain and right leg symptoms.  He was last seen in the office in October of last year.  He has history of surgery by Dr. Mota for extreme right leg radiculopathy that resolved after surgery.  However, the patient has had some persisting numbness in the right foot.  He experienced a flareup of pain last summer and a current MRI showed a new right L3 4 disc herniation.  When he returned in October for follow-up, his radiculopathy have resolved completely, and there was only numbness involving the dorsal aspect of his right foot.  He was scheduled to have EMG and nerve conduction testing to further evaluate this, but has never had the testing completed.  He has continued in pain management with Dr. Peace.    He tells me today that he has been having trouble with pain in his right lower back, hip and buttock.for a couple of months or so.  He thinks he has been out to the orthopedic [place] and had an x-ray that showed arthritis in the hip.  He was told that at some point he will probably need to think about a hip replacement.  He is also going to be moving back to [place], which is his home.  He tells me that it cannot be as soon as tonight.  He has already made arrangements to establish with pain management pair as well as primary care.  He has been out of his " chronic pain medicine for the last couple of days, and his request for a refill has not yet been done.  He is needing pain medication to help him get to [place] and and establish with new pain management next month.      Physical Examination:  Upon exam, he looks pretty good.  He does seem uncomfortable and tends to hold his right hand on his right hip.  Straight leg raising is done relatively well.  Deep tendon reflexes 1-2+.  Generalized lower extremity weakness.      Medical Decision Making  Treatment Options:   In the office we have talked about his ongoing care.  Literally, he can be leaving later today for [place] and does not plan on ever returning here.  He tells me that he has given his resignation to Dr. Peace's office.  I think he has been forthcoming with me and I am agreeable to refilling a month's supply of his chronic pain medication.  I am also forwarding this note to their office as well.  I have encouraged the patient to go ahead and follow-up is planned once he has relocated.  From our standpoint we moved to an as needed basis.    Information regarding smoking cessation has been given in an effort to help support overall health and wellness.      Assessment and Plan  Mehul was seen today for follow-up.    Diagnoses and all orders for this visit:    Chronic bilateral low back pain with right-sided sciatica    Right hip pain    Cigarette smoker    Normal body mass index (BMI)    Other orders  -     HYDROcodone-acetaminophen (NORCO)  MG per tablet; Take 1 tablet by mouth Every 8 (Eight) Hours As Needed for Moderate Pain .        Return if symptoms worsen or fail to improve.      CAM Mcgregor